# Patient Record
Sex: FEMALE | Race: WHITE | NOT HISPANIC OR LATINO | Employment: OTHER | URBAN - METROPOLITAN AREA
[De-identification: names, ages, dates, MRNs, and addresses within clinical notes are randomized per-mention and may not be internally consistent; named-entity substitution may affect disease eponyms.]

---

## 2018-01-22 ENCOUNTER — OFFICE VISIT (OUTPATIENT)
Dept: NEUROLOGY | Facility: CLINIC | Age: 78
End: 2018-01-22
Payer: MEDICARE

## 2018-01-22 ENCOUNTER — HOSPITAL ENCOUNTER (OUTPATIENT)
Dept: RADIOLOGY | Facility: HOSPITAL | Age: 78
Discharge: HOME | End: 2018-01-22
Attending: PSYCHIATRY & NEUROLOGY
Payer: MEDICARE

## 2018-01-22 VITALS
RESPIRATION RATE: 14 BRPM | DIASTOLIC BLOOD PRESSURE: 96 MMHG | HEIGHT: 63 IN | BODY MASS INDEX: 25.9 KG/M2 | WEIGHT: 146.19 LBS | SYSTOLIC BLOOD PRESSURE: 156 MMHG | HEART RATE: 76 BPM

## 2018-01-22 DIAGNOSIS — R41.3 MEMORY LOSS: ICD-10-CM

## 2018-01-22 DIAGNOSIS — R41.3 MEMORY LOSS: Primary | ICD-10-CM

## 2018-01-22 PROCEDURE — 99999 PR PBB SHADOW E&M-EST. PATIENT-LVL III: CPT | Mod: PBBFAC,,, | Performed by: PSYCHIATRY & NEUROLOGY

## 2018-01-22 PROCEDURE — 70450 CT HEAD/BRAIN W/O DYE: CPT | Mod: 26,,, | Performed by: RADIOLOGY

## 2018-01-22 PROCEDURE — 70450 CT HEAD/BRAIN W/O DYE: CPT | Mod: TC

## 2018-01-22 PROCEDURE — 99204 OFFICE O/P NEW MOD 45 MIN: CPT | Mod: S$GLB,,, | Performed by: PSYCHIATRY & NEUROLOGY

## 2018-01-22 RX ORDER — DONEPEZIL HYDROCHLORIDE 10 MG/1
TABLET, FILM COATED ORAL
Qty: 30 TABLET | Refills: 11 | Status: ON HOLD | OUTPATIENT
Start: 2018-01-22 | End: 2019-07-25 | Stop reason: HOSPADM

## 2018-01-22 RX ORDER — ESCITALOPRAM OXALATE 10 MG/1
10 TABLET ORAL DAILY
COMMUNITY
Start: 2017-12-20 | End: 2019-08-28 | Stop reason: ALTCHOICE

## 2018-01-22 RX ORDER — LISINOPRIL 40 MG/1
40 TABLET ORAL DAILY
COMMUNITY
Start: 2018-01-16 | End: 2019-07-23 | Stop reason: DRUGHIGH

## 2018-01-22 NOTE — LETTER
January 22, 2018      Gabriel Bhandari MD  [address] [facility]  [place] [address]           [facility]  [address] [facility] [address]  Phone: [phone]  Fax: [phone]          Patient: Silvia Tinajero   MR Number: [id number]   YOB: 1940   Date of Visit: 1/22/2018       Dear Dr. Gabriel Bhandari:    Thank you for referring Silvia Tinajero to me for evaluation. Attached you will find relevant portions of my assessment and plan of care.    If you have questions, please do not hesitate to call me. I look forward to following Silvia Tinajero along with you.    Sincerely,    Sharad Macias MD    Enclosure  CC:  No Recipients    If you would like to receive this communication electronically, please contact [email or web address] or [phone] to request more information on [facility] access.    For providers and/or their staff who would like to refer a patient to [facility], please contact us through our one-stop-shop provider referral line, [facility], at [phone].    If you feel you have received this communication in error or would no longer like to receive these types of communications, please e-mail [email or web address]

## 2018-01-22 NOTE — PROGRESS NOTES
Consult from Dr NGHIA Bhandari    HPI: Silvia Tinajero is a 77 y.o. female with a history of memory problems first noted by patient and spouse The symptoms started 1 year ago but have worsened more recently.   Examples of memory problems noted include? She forgets where she placed thinks (even has difficulty knowing where things like pots and pans are which have been in the same places). She forgot where she parked at [employer] and now makes a note to self (written) where she parked. She forgets where she is in conversations.  has to orient her to date and day of the week at times-she uses a calendar   Level of education completed is 7th grade. Former homemaker  Impairment in ADLS such as doing bills, cooking, doing laundry, dressing self? No difficulty.  supervises cooking and smoke detector is place.  and she monitor bills which are mostly automatic.   She does all house work without difficulty  Mood is described as normal but more impatient  than she used to be. The patient does not have any delusions, hallucinations, paranoia,  falls, or tremors. There is not a prior history of stroke. There is not a prior history o f head trauma. There is a family history of memory loss in her sister and cousin.  The patient is still driving and is still cooking. There are no accidents or incidents.  The patient is here with her second  of 37 years. He has 5 children and her 2 children are .   She enjoys going to [employer].    Review of Systems   Constitutional: Negative for fever.   HENT: Negative for nosebleeds.    Eyes: Negative for double vision.   Respiratory: Negative for sputum production.    Cardiovascular: Negative for palpitations.   Gastrointestinal: Negative for blood in stool.   Genitourinary: Negative for hematuria.   Musculoskeletal: Negative for falls.   Skin: Negative for rash.   Neurological: Negative for tremors.   Psychiatric/Behavioral: Positive for memory loss.         I have  reviewed all of this patient's past medical and surgical histories as well as family and social histories and active allergies and medications as documented in the electronic medical record.    Exam:  Gen Appearance, well developed/nourished in no apparent distress  CV: 2+ distal pulses with no edema or swelling  Neuro:  MS: Awake, alert, oriented to place, person, time as January and does not know exact date, situation. Sustains attention. Recent recall 2/3 at 3 minutes and 2/3 at 10 minutes/remote memory intact- she is able to tell past plans, Language is full to spontaneous speech/repetition/naming/comprehension. Fund of Knowledge is full  She is able to name the current president.   No neglect or apraxia  Patient has good insight and judgement  CN: Optic discs are flat with normal vasculature, PERRL, Extraoccular movements and visual fields are full. Normal facial sensation and strength, Hearing symmetric, Tongue and Palate are midline and strong. Shoulder Shrug symmetric and strong.  Motor: Normal bulk, tone, no abnormal movements. 5/5 strength bilateral upper/lower extremities with 2+ reflexes and no clonus  Sensory: symmetric to light touch, pain, temp, and vibration,  Romberg negative  Cerebellar: Finger-nose,Heal-shin, Rapid alternating movements intact  Gait: Normal stance, no ataxia      Assessment/Plan: Silvia Tinajero is a 77 y.o. female with one year of memory loss, worsening over time.   I recommend:   1. CT head   2. CMP, CBC, TSH, B12  3. Given her age and progression of symptoms this is either MCI vs early alzheimers. Start Aricept per orders unless GI upset or light headedness. The importance of good diet and physical and memory exercises was discussed.   4. Can continue local driving in good daylight conditions.  to report any accidents, concerns, or incidents to me.   RTC 4-6 months  CC:  Dr NGHIA Bhandari

## 2018-04-16 ENCOUNTER — TELEPHONE (OUTPATIENT)
Dept: NEUROLOGY | Facility: CLINIC | Age: 78
End: 2018-04-16

## 2018-04-16 RX ORDER — MEMANTINE HYDROCHLORIDE 5 MG/1
TABLET ORAL
Qty: 60 TABLET | Refills: 11 | Status: SHIPPED | OUTPATIENT
Start: 2018-04-16 | End: 2019-08-28 | Stop reason: ALTCHOICE

## 2018-04-16 NOTE — TELEPHONE ENCOUNTER
Patient started off taking 5 mg Aricept 1/22/18, then after 6 weeks she increased to the full dose 10 mg nightly. Per  since increasing to the full dose which she has been taking for almost 6 weeks now she has been having cramping in her legs and is asking for a change in medication for her as this was the only new medication added.

## 2018-04-16 NOTE — TELEPHONE ENCOUNTER
----- Message from Karma Smith MA sent at 2018  8:40 AM CDT -----  Contact: Darin--   Silvia Tinajero  MRN: [id number]  : 1940  PCP: Gabriel Bhandari  Home Phone      [phone]  Work Phone      Not on file.  Mobile          Not on file.      MESSAGE:  Darin states that since patient is on Aricept 10 mg and has been having severe cramping in her legs and joints. He is asking for something else to be called in to [facility] in [place]. Any further questions he can be reached at [phone].

## 2019-07-23 PROBLEM — I50.21 ACUTE SYSTOLIC HEART FAILURE: Status: ACTIVE | Noted: 2019-07-23

## 2019-07-23 PROBLEM — I20.9 ANGINA, CLASS II: Status: ACTIVE | Noted: 2019-07-23

## 2019-07-23 PROBLEM — I35.0 SEVERE AORTIC STENOSIS: Status: ACTIVE | Noted: 2019-07-23

## 2019-07-25 PROBLEM — R63.8 ALTERATION IN NUTRITION: Status: ACTIVE | Noted: 2019-07-25

## 2019-09-05 ENCOUNTER — OFFICE VISIT (OUTPATIENT)
Dept: NEUROLOGY | Facility: CLINIC | Age: 79
End: 2019-09-05
Payer: MEDICARE

## 2019-09-05 VITALS
HEIGHT: 64 IN | BODY MASS INDEX: 21.38 KG/M2 | RESPIRATION RATE: 16 BRPM | DIASTOLIC BLOOD PRESSURE: 72 MMHG | WEIGHT: 125.25 LBS | HEART RATE: 70 BPM | SYSTOLIC BLOOD PRESSURE: 116 MMHG

## 2019-09-05 DIAGNOSIS — F05 SUNDOWNING: ICD-10-CM

## 2019-09-05 DIAGNOSIS — G30.1 LATE ONSET ALZHEIMER'S DISEASE WITHOUT BEHAVIORAL DISTURBANCE: Primary | ICD-10-CM

## 2019-09-05 DIAGNOSIS — F02.80 LATE ONSET ALZHEIMER'S DISEASE WITHOUT BEHAVIORAL DISTURBANCE: Primary | ICD-10-CM

## 2019-09-05 DIAGNOSIS — R09.89 DEPRESSED LEFT VENTRICULAR EJECTION FRACTION: ICD-10-CM

## 2019-09-05 DIAGNOSIS — I35.0 SEVERE AORTIC STENOSIS: ICD-10-CM

## 2019-09-05 PROCEDURE — 99999 PR PBB SHADOW E&M-EST. PATIENT-LVL III: CPT | Mod: PBBFAC,,, | Performed by: PSYCHIATRY & NEUROLOGY

## 2019-09-05 PROCEDURE — 99999 PR PBB SHADOW E&M-EST. PATIENT-LVL III: ICD-10-PCS | Mod: PBBFAC,,, | Performed by: PSYCHIATRY & NEUROLOGY

## 2019-09-05 PROCEDURE — 99214 OFFICE O/P EST MOD 30 MIN: CPT | Mod: S$GLB,,, | Performed by: PSYCHIATRY & NEUROLOGY

## 2019-09-05 PROCEDURE — 99214 PR OFFICE/OUTPT VISIT, EST, LEVL IV, 30-39 MIN: ICD-10-PCS | Mod: S$GLB,,, | Performed by: PSYCHIATRY & NEUROLOGY

## 2019-09-05 RX ORDER — TORSEMIDE 20 MG/1
1 TABLET ORAL DAILY
Status: ON HOLD | COMMUNITY
Start: 2019-08-29 | End: 2020-07-20 | Stop reason: CLARIF

## 2019-09-05 RX ORDER — POTASSIUM CHLORIDE 750 MG/1
1 TABLET, EXTENDED RELEASE ORAL DAILY
Refills: 11 | Status: ON HOLD | COMMUNITY
Start: 2019-07-29 | End: 2020-07-20 | Stop reason: CLARIF

## 2019-09-05 RX ORDER — LISINOPRIL 40 MG/1
20 TABLET ORAL DAILY
Refills: 2 | Status: ON HOLD | COMMUNITY
Start: 2019-08-06 | End: 2020-07-20 | Stop reason: CLARIF

## 2019-09-05 NOTE — PROGRESS NOTES
HPI: Silvia Tinajero is a 79 y.o. female with a history of memory problems  She last saw me for the same complaint in 2018 but failed to follow up since  She is referred back from Dr Maldonado due to MMSE score of 10     states patient was loosing weight on Namenda so is was stopped by cardiology     states patient's memory is worse over the past year.  She repeats herself and asks the same questions constantly.  She is no longer loosing weight per / has gained some weight back.      She is on Lexapro and states mood is good. She is sleeping good.     NO stroke like symptoms.    No hallucinations. No wandering. Lives with  who is here today. She is mildly fearful at night and  reassures her    She no longer drives.     She had some confusion for about 4 hours after anaesthesia with angiogram      Review of Systems   Unable to perform ROS: Dementia         I have reviewed all of this patient's past medical and surgical histories as well as family and social histories and active allergies and medications as documented in the electronic medical record.        Exam:  Gen Appearance, well developed/nourished in no apparent distress  CV: 2+ distal pulses with no edema or swelling  Neuro:  MS: Awake, alert, oriented to place, person, but not to  time, and mostly to situation. Sustains attention. Recent recall is impaired/remote memory intact, Language is full to spontaneous speech/comprehension. Fund of Knowledge is less than expected  She is able to name her children who are both . She can name some grandkids but she does not see them frequently  She can only name the president with hints.   CN: Optic discs are flat with normal vasculature, PERRL, Extraoccular movements and visual fields are full. Normal facial sensation and strength, Hearing symmetric, Tongue and Palate are midline and strong. Shoulder Shrug symmetric and strong.  Motor: Normal bulk, tone, no abnormal movements.  5/5 strength bilateral upper/lower extremities with 2+ reflexes and no clonus  Sensory: symmetric to light touch, pain, temp, and vibration/proprioception. Romberg negative  Cerebellar: Finger-nose,Heal-shin, Rapid alternating movements intact  Gait: Normal stance, no ataxia    Imagin2018 CT head: Unremarkable noncontrast CT head specifically without evidence for acute intracranial hemorrhage. Further evaluation as warrented clinically.  Labs: CMP, CBC, TSH, B12 reviewed from 2018. Calcium elevated, sent to PCP for review. 2019 Calcium normal    Assessment/Plan: Silvia Tinajero is a 77 y.o. female with over one year of memory loss, worsening over time.   Consistent with Alzheimer's Disease  I recommend:     1. She had leg cramping with Aricept. Will avoid medications from this class given her AS/syncope history  2. Namenda caused weight loss (which is resolved)  3. Unfortunately, treatment will be supportive. She no longer drives and lives with .   4. Note her current cardiac history with echo now with LV dysfunction (EF 35%) and critical to severe AS.  She had recent syncopal spell suggesting severe aortic stenosis.   -She is anticipating another cardiac procedure.  and patient are aware that some temporary confusion may worsen with the hospital stay and sedation, but this should improved post discharge.   5. She is currently on Lexapro with good control of her mood.  reassures some mild sundowning type fears.     RTC 6 months  CC:  Dr Maldonado

## 2019-09-30 PROBLEM — I50.43 ACUTE ON CHRONIC COMBINED SYSTOLIC AND DIASTOLIC CHF, NYHA CLASS 3: Status: ACTIVE | Noted: 2019-09-30

## 2020-07-19 PROBLEM — S72.001A CLOSED FRACTURE OF NECK OF RIGHT FEMUR: Status: ACTIVE | Noted: 2020-07-19

## 2020-07-20 PROBLEM — F02.80 LATE ONSET ALZHEIMER'S DISEASE WITHOUT BEHAVIORAL DISTURBANCE: Chronic | Status: ACTIVE | Noted: 2020-07-20

## 2020-07-20 PROBLEM — I10 ESSENTIAL HYPERTENSION: Chronic | Status: ACTIVE | Noted: 2020-07-20

## 2020-07-20 PROBLEM — G30.1 LATE ONSET ALZHEIMER'S DISEASE WITHOUT BEHAVIORAL DISTURBANCE: Chronic | Status: ACTIVE | Noted: 2020-07-20

## 2020-07-22 PROBLEM — E87.1 HYPONATREMIA: Status: ACTIVE | Noted: 2020-07-22

## 2020-09-29 PROBLEM — I35.0 AORTIC STENOSIS: Status: ACTIVE | Noted: 2020-09-29

## 2020-09-29 PROBLEM — I35.0 AORTIC STENOSIS: Status: RESOLVED | Noted: 2020-09-29 | Resolved: 2020-09-29
